# Patient Record
Sex: FEMALE | Race: WHITE
[De-identification: names, ages, dates, MRNs, and addresses within clinical notes are randomized per-mention and may not be internally consistent; named-entity substitution may affect disease eponyms.]

---

## 2018-10-03 ENCOUNTER — HOSPITAL ENCOUNTER (OUTPATIENT)
Dept: HOSPITAL 74 - FASU | Age: 73
Discharge: HOME | End: 2018-10-03
Attending: OPHTHALMOLOGY
Payer: COMMERCIAL

## 2018-10-03 VITALS — DIASTOLIC BLOOD PRESSURE: 68 MMHG | HEART RATE: 71 BPM | SYSTOLIC BLOOD PRESSURE: 135 MMHG | TEMPERATURE: 98 F

## 2018-10-03 VITALS — BODY MASS INDEX: 37.9 KG/M2

## 2018-10-03 DIAGNOSIS — H26.8: Primary | ICD-10-CM

## 2018-10-03 PROCEDURE — 08RJ3JZ REPLACEMENT OF RIGHT LENS WITH SYNTHETIC SUBSTITUTE, PERCUTANEOUS APPROACH: ICD-10-PCS | Performed by: OPHTHALMOLOGY

## 2018-10-03 RX ADMIN — CYCLOPENTOLATE HYDROCHLORIDE ONE DROP: 20 SOLUTION/ DROPS OPHTHALMIC at 07:35

## 2018-10-03 RX ADMIN — TROPICAMIDE ONE DROP: 10 SOLUTION/ DROPS OPHTHALMIC at 07:25

## 2018-10-03 RX ADMIN — CYCLOPENTOLATE HYDROCHLORIDE ONE DROP: 20 SOLUTION/ DROPS OPHTHALMIC at 07:30

## 2018-10-03 RX ADMIN — CIPROFLOXACIN HYDROCHLORIDE ONE DROP: 3 SOLUTION/ DROPS OPHTHALMIC at 07:25

## 2018-10-03 RX ADMIN — CYCLOPENTOLATE HYDROCHLORIDE ONE DROP: 20 SOLUTION/ DROPS OPHTHALMIC at 07:25

## 2018-10-03 RX ADMIN — PHENYLEPHRINE HYDROCHLORIDE ONE DROP: 0.25 SPRAY NASAL at 07:30

## 2018-10-03 RX ADMIN — CIPROFLOXACIN HYDROCHLORIDE ONE DROP: 3 SOLUTION/ DROPS OPHTHALMIC at 07:35

## 2018-10-03 RX ADMIN — TROPICAMIDE ONE DROP: 10 SOLUTION/ DROPS OPHTHALMIC at 07:35

## 2018-10-03 RX ADMIN — CIPROFLOXACIN HYDROCHLORIDE ONE DROP: 3 SOLUTION/ DROPS OPHTHALMIC at 07:30

## 2018-10-03 RX ADMIN — PHENYLEPHRINE HYDROCHLORIDE ONE DROP: 0.25 SPRAY NASAL at 07:25

## 2018-10-03 RX ADMIN — PHENYLEPHRINE HYDROCHLORIDE ONE DROP: 0.25 SPRAY NASAL at 07:35

## 2018-10-03 RX ADMIN — TROPICAMIDE ONE DROP: 10 SOLUTION/ DROPS OPHTHALMIC at 07:30

## 2018-10-03 NOTE — OP
DATE OF OPERATION:  10/03/2018

 

OPERATIVE PROCEDURE:  Lens Phacoemulsification with Posterior Chamber Intraocular

Lens Placement, Right Eye

 

PREOPERATIVE DIAGNOSIS: Visually Significant Cataract of Right Eye

 

POSTOPERATIVE DIAGNOSIS:  Visually Significant Cataract of Right Eye

 

SURGEON:  Jluis Dowling M.D.

 

ANESTHESIA:  MAC

 

PROCEDURE:  The patient was brought to the operating room and placed under monitored

anesthesia care by Anesthesia.  A drop of Tetracaine was then placed over the right

eye.  The patient was then prepped and draped in the usual sterile manner.  A

speculum was then placed over the right eye.  The eye was then well irrigated with

copious amounts of BSS (balanced salt solution). 

 

The operating microscope was then moved into position.  A paracentesis was performed

using a 15 degree blade. At this point 0.5 mL of 1% preservative free-lidocaine was

injected into the anterior chamber.  Amvisc plus was then injected into the anterior

chamber.  A clear corneal incision was then formed using a 2.2 mm keratome.  A

capsulorrhexis was then performed in a continuous circular fashion beginning with a

cystotome completed with an Utratas forceps. Hydrodissection was then performed using

BSS on a cannula.  The phaco probe was then introduced through the corneal wound and

the cataract was removed using the phaco chop technique.  Approximately 3 seconds of

absolute phaco time was used.  The remaining cortex was then removed using irrigation

and aspiration with an I/A probe. The capsule was then filled with regular Amvisc and

the capsule was noted to be intact.  A previously selected foldable posterior chamber

intraocular lens was then injected into the capsule through the corneal wound using a

lens injector.  It was then dialed into position using a Sinskey hook.  The Amvisc

was then removed using irrigation and aspiration.  Miostat was then injected through

the paracentesis to constrict the pupil.  The paracentesis and corneal wound were

then hydrated and noted to be water tight. A drop of Maxitrol was then placed over

the eye.  The speculum was removed and clear shield was taped over the eye. 

 

The patient tolerated the procedure well and there were no surgical complications. 

The patient was asked to follow up in my office the next day.

 

 

JLUIS DOWLING M.D. [id number]

DD: 10/03/2018 08:48

DT: 10/03/2018 09:14

Job #:  [id number]

## 2018-11-07 ENCOUNTER — HOSPITAL ENCOUNTER (OUTPATIENT)
Dept: HOSPITAL 74 - FASU | Age: 73
Discharge: HOME | End: 2018-11-07
Attending: OPHTHALMOLOGY
Payer: COMMERCIAL

## 2018-11-07 VITALS — DIASTOLIC BLOOD PRESSURE: 65 MMHG | SYSTOLIC BLOOD PRESSURE: 128 MMHG | HEART RATE: 70 BPM

## 2018-11-07 VITALS — TEMPERATURE: 97 F

## 2018-11-07 VITALS — BODY MASS INDEX: 37.1 KG/M2

## 2018-11-07 DIAGNOSIS — H21.542: ICD-10-CM

## 2018-11-07 DIAGNOSIS — H26.8: Primary | ICD-10-CM

## 2018-11-07 PROCEDURE — 08N1XZZ RELEASE LEFT EYE, EXTERNAL APPROACH: ICD-10-PCS | Performed by: OPHTHALMOLOGY

## 2018-11-07 PROCEDURE — 08RK3JZ REPLACEMENT OF LEFT LENS WITH SYNTHETIC SUBSTITUTE, PERCUTANEOUS APPROACH: ICD-10-PCS | Performed by: OPHTHALMOLOGY

## 2018-11-07 RX ADMIN — TROPICAMIDE ONE DROP: 10 SOLUTION/ DROPS OPHTHALMIC at 07:20

## 2018-11-07 RX ADMIN — CYCLOPENTOLATE HYDROCHLORIDE ONE DROP: 20 SOLUTION/ DROPS OPHTHALMIC at 07:20

## 2018-11-07 RX ADMIN — CIPROFLOXACIN HYDROCHLORIDE ONE DROP: 3 SOLUTION/ DROPS OPHTHALMIC at 07:10

## 2018-11-07 RX ADMIN — PHENYLEPHRINE HYDROCHLORIDE ONE DROP: 0.25 SPRAY NASAL at 07:10

## 2018-11-07 RX ADMIN — CIPROFLOXACIN HYDROCHLORIDE ONE DROP: 3 SOLUTION/ DROPS OPHTHALMIC at 07:20

## 2018-11-07 RX ADMIN — PHENYLEPHRINE HYDROCHLORIDE ONE DROP: 0.25 SPRAY NASAL at 07:20

## 2018-11-07 RX ADMIN — CYCLOPENTOLATE HYDROCHLORIDE ONE DROP: 20 SOLUTION/ DROPS OPHTHALMIC at 07:15

## 2018-11-07 RX ADMIN — PHENYLEPHRINE HYDROCHLORIDE ONE DROP: 0.25 SPRAY NASAL at 07:15

## 2018-11-07 RX ADMIN — TROPICAMIDE ONE DROP: 10 SOLUTION/ DROPS OPHTHALMIC at 07:10

## 2018-11-07 RX ADMIN — TROPICAMIDE ONE DROP: 10 SOLUTION/ DROPS OPHTHALMIC at 07:15

## 2018-11-07 RX ADMIN — CIPROFLOXACIN HYDROCHLORIDE ONE DROP: 3 SOLUTION/ DROPS OPHTHALMIC at 07:15

## 2018-11-07 RX ADMIN — CYCLOPENTOLATE HYDROCHLORIDE ONE DROP: 20 SOLUTION/ DROPS OPHTHALMIC at 07:10

## 2018-11-07 NOTE — OP
DATE OF OPERATION:  11/07/2018

 

OPERATIVE PROCEDURE:  Lysis of Posterior Iris Lens Synechia and Lens

Phacoemulsification with Posterior Chamber Intraocular Lens Placement, Left Eye

 

PREOPERATIVE DIAGNOSIS:  Visually Significant Cataract and Posterior Synechia of Left

Eye

 

POSTOPERATIVE DIAGNOSIS:  Visually Significant Cataract and Posterior Synechia of

Left Eye

 

SURGEON:  Jluis Dowling M.D.

 

ANESTHESIA:  MAC

 

PROCEDURE:  The patient was brought to the operating room and placed under monitored

anesthesia care by Anesthesia.  A drop of Tetracaine was then placed over the left

eye.  The patient was then prepped and draped in the usual sterile manner.  A

speculum was then placed over the left eye.  The eye was then well irrigated with

copious amounts of BSS (balanced salt solution).  The operating microscope was then

moved into position. 

 

A paracentesis was performed using a 15 degree blade. At this point, 0.5 mL of 1%

preservative-free lidocaine was injected into the anterior chamber.  Amvisc plus was

then injected into the anterior chamber.  A clear corneal incision was then formed

using a 2.2 mm keratome.  A cyclodialysis spatula was then used to break the

posterior synechia.  Two Adbongoglen iris hooks were then used to stretch the iris.  More

Amvisc plus was then injected into the anterior chamber.  A capsulorrhexis was then

performed in a continuous circular fashion beginning with a cystotome and completed

with an Utrata's forceps.  Hydrodissection was then performed using BSS on a cannula.

The phaco probe was then introduced through the corneal wound and the cataract was

removed using the phaco chop technique.  Approximately 3 seconds of absolute phaco

time was used.  The remaining cortex was then removed using irrigation and aspiration

with an I/A probe.  The capsule was then filled with regular Amvisc and the capsule

was noted to be intact. A previously selected foldable posterior chamber intraocular

lens was then injected into the capsule through the corneal wound using a lens

injector.  It was then dialed into position using a Sinskey hook.  The Amvisc was

then removed using irrigation and aspiration.  Miostat was then injected through the

paracentesis to constrict the pupil.  The paracentesis and corneal wound were then

hydrated and noted to be water tight.  A drop of Maxitrol was then placed over the

eye.  The speculum was removed and clear shield was taped over the eye. 

 

The patient tolerated the procedure well and there were no surgical complications.

The patient was asked to follow up in my office the next day.

 

 

JLUIS DOWLING M.D.

 

[id number]

DD: 11/07/2018 09:23

DT: 11/07/2018 10:26

Job #:  [id number]

## 2019-07-06 ENCOUNTER — HOSPITAL ENCOUNTER (EMERGENCY)
Dept: HOSPITAL 74 - FER | Age: 74
Discharge: HOME | End: 2019-07-06
Payer: COMMERCIAL

## 2019-07-06 VITALS — SYSTOLIC BLOOD PRESSURE: 110 MMHG | HEART RATE: 85 BPM | DIASTOLIC BLOOD PRESSURE: 60 MMHG | TEMPERATURE: 98.5 F

## 2019-07-06 VITALS — BODY MASS INDEX: 37.1 KG/M2

## 2019-07-06 DIAGNOSIS — N20.0: Primary | ICD-10-CM

## 2019-07-06 LAB
ALBUMIN SERPL-MCNC: 3.8 G/DL (ref 3.4–5)
ALP SERPL-CCNC: 88 U/L (ref 45–117)
ALT SERPL-CCNC: 13 U/L (ref 13–61)
ANION GAP SERPL CALC-SCNC: 6 MMOL/L (ref 8–16)
APTT BLD: 26.8 SECONDS (ref 25.2–36.5)
AST SERPL-CCNC: 22 U/L (ref 15–37)
BASOPHILS # BLD: 0.4 % (ref 0–2)
BILIRUB SERPL-MCNC: 1.5 MG/DL (ref 0.2–1)
BUN SERPL-MCNC: 18 MG/DL (ref 7–18)
CALCIUM SERPL-MCNC: 8.7 MG/DL (ref 8.5–10)
CAOX CRY URNS QL MICRO: (no result) /HPF
CHLORIDE SERPL-SCNC: 103 MMOL/L (ref 98–107)
CO2 SERPL-SCNC: 28 MMOL/L (ref 21–32)
CREAT SERPL-MCNC: 0.7 MG/DL (ref 0.55–1.3)
DEPRECATED RDW RBC AUTO: 12.9 % (ref 11.6–15.6)
EOSINOPHIL # BLD: 1.2 % (ref 0–4.5)
EPITH CASTS URNS QL MICRO: (no result) /HPF
GLUCOSE SERPL-MCNC: 122 MG/DL (ref 74–106)
HCT VFR BLD CALC: 45.1 % (ref 32.4–45.2)
HGB BLD-MCNC: 15.3 GM/DL (ref 10.7–15.3)
INR BLD: 1.22 (ref 0.82–1.09)
LYMPHOCYTES # BLD: 10.6 % (ref 8–40)
MCH RBC QN AUTO: 30.2 PG (ref 25.7–33.7)
MCHC RBC AUTO-ENTMCNC: 33.8 G/DL (ref 32–36)
MCV RBC: 89.3 FL (ref 80–96)
MONOCYTES # BLD AUTO: 5.5 % (ref 3.8–10.2)
NEUTROPHILS # BLD: 82.3 % (ref 42.8–82.8)
PLATELET # BLD AUTO: 224 K/MM3 (ref 134–434)
PMV BLD: 7.4 FL (ref 7.5–11.1)
POTASSIUM SERPLBLD-SCNC: 3.7 MMOL/L (ref 3.5–5.1)
PROT SERPL-MCNC: 6.8 G/DL (ref 6.4–8.2)
PT PNL PPP: 13.6 SEC (ref 10.2–13)
RBC # BLD AUTO: 5.05 M/MM3 (ref 3.6–5.2)
SODIUM SERPL-SCNC: 137 MMOL/L (ref 136–145)
WBC # BLD AUTO: 10.1 K/MM3 (ref 4–10.8)

## 2019-07-06 PROCEDURE — 3E0337Z INTRODUCTION OF ELECTROLYTIC AND WATER BALANCE SUBSTANCE INTO PERIPHERAL VEIN, PERCUTANEOUS APPROACH: ICD-10-PCS

## 2019-07-06 PROCEDURE — 3E0333Z INTRODUCTION OF ANTI-INFLAMMATORY INTO PERIPHERAL VEIN, PERCUTANEOUS APPROACH: ICD-10-PCS

## 2019-07-06 PROCEDURE — 3E03329 INTRODUCTION OF OTHER ANTI-INFECTIVE INTO PERIPHERAL VEIN, PERCUTANEOUS APPROACH: ICD-10-PCS

## 2019-07-06 PROCEDURE — 3E033GC INTRODUCTION OF OTHER THERAPEUTIC SUBSTANCE INTO PERIPHERAL VEIN, PERCUTANEOUS APPROACH: ICD-10-PCS

## 2019-07-06 PROCEDURE — 3E033NZ INTRODUCTION OF ANALGESICS, HYPNOTICS, SEDATIVES INTO PERIPHERAL VEIN, PERCUTANEOUS APPROACH: ICD-10-PCS

## 2019-07-06 NOTE — PDOC
Attending Attestation





- Resident


Resident Name: Jam Hsu





- ED Attending Attestation


I have performed the following: I have examined & evaluated the patient, The 

case was reviewed & discussed with the resident, I agree w/resident's findings 

& plan, Exceptions are as noted





- HPI


HPI: 





07/06/19 13:33


72yo female with hx of diverticulitis with n/v/d and LLQ abd pain. Pt states 

pain started thursday and n/v/d started after the pain. 3 episodes of vomiting 

last night and this am - nonbloody and nonbilious. States black stool, no brb. 

Pt states she saw [facility] yesterday and was started on augmenting - n/v 

worsened and now with dry heaving. No fevers. Still with pain. Pt states she 

hasn't been able to eat or drink since thursday. Pt appears dehydrated upon 

exam. Pt ambulated into the ed. Denies urinary complaints. 





- Physicial Exam


PE: 





07/06/19 13:35


Gen: aaox3, dehydrated, dry mm


heent: dry cracked  lips and tongue


heart: +s1s2 reg


lungs: cta b/l


abd: soft, llq ttp, no rebound or guarding, obese abd


ext: trace swelling in LE, well healed mohs surgery sites.





- Medical Decision Making





07/06/19 13:36


a/p: 72yo female with LLQ pain, n/v/d


-black stool- will send for heme


-on augmentin for poss diverticulitis since yesterday still with n/v/d


-still with pain


-will send labs, rectal exam, ct abd/pelvis


-pt with shellfish allergy


-will obtain noncontrast ct


-pt appears dehydrated, will medicate


-will monitor and reassess


-pt ambulated into the ED


07/06/19 13:37


labs reviewed, no elevated wbc


07/06/19 13:37


stool for heme negative


07/06/19 15:42


pt with kidney stone and uti


will start iv rocephin


will start flomax





07/06/19 17:13


pt feeling better


wants to go home


will dc with meds and abx


will give urology follow up


resident discussed all reasons to return to the ED

## 2019-07-06 NOTE — PDOC
History of Present Illness





- General


Chief Complaint: Pain


Stated Complaint: ABD PAIN


Time Seen by Provider: 07/06/19 12:02


History Source: Patient, Family





- History of Present Illness


Initial Comments: 





07/06/19 12:07


Ms. Aguila is a 72 y/o woman with hx of diverticulitis p/w two days of ongoing 

LLQ pain radiating to her back. She was seen by her gastroenteroligist (Dr. [facility]ero[facility]: [phone]) yesterday (7/5/2019) whom initiated treatment for 

diverticulitis with amoxicillin-clavulanate 875-125mg (abx day 2). They 

instructed her to return for CT evaluation in the pain worsened. Last night she 

had difficulty sleeping due to the pain in her LLQ, which prompted her to seek 

evaluation today. She reports that last night the pain was a 10/10, mildly 

alleviated by ice packs, radiating to her back. She reports that the pain is 

intermittent - she is not in pain at this time. She reports decrease in 

appetite since the pain began, and reports several episodes (approx 7) of non-

bloody non-bilious vomiting over the past two days. She reports having "a few" 

dark, bloody bowel movements yesterday, but has been passing stool and gas 

without difficulty. She denies any fever, chills, (change in appetite), fatigue

, night sweats. She reports that her last colonoscopy as 3-4 years ago and 

showed only diverticulosis. 





PCP: Dr. Alcazar


Timing/Duration: other (2 days)


Severity: moderate, severe


Modifying Factors: improves with: cold therapy (alleviated mildly)


Associated Symptoms: reports: loss of appetite, nausea/vomiting (4-5 times July 4/5th, 3x yesterday. Non-bloody non billious).  denies: chest pain, cough, 

diaphoresis, fever/chills, headaches, rash, syncope, weakness





Past History





- Past Medical History


Allergies/Adverse Reactions: 


 Allergies











Allergy/AdvReac Type Severity Reaction Status Date / Time


 


codeine Allergy Severe Vomiting Verified 07/06/19 12:11


 


gemifloxacin [From Factive] Allergy Severe Itching Verified 07/06/19 12:11


 


latex Allergy Severe Swelling Verified 07/06/19 12:11


 


levofloxacin [From Levaquin] Allergy Severe Itching Verified 07/06/19 12:11


 


shellfish derived Allergy Severe Swelling Verified 07/06/19 12:11


 


tramadol [From Ultram] Allergy Severe Itching Verified 07/06/19 12:11


 


methylprednisolone AdvReac Severe Swelling Verified 07/06/19 12:11











Home Medications: 


Ambulatory Orders





Cholecalciferol (Vitamin D3) [Vitamin D3] 1,000 unit PO DAILY 09/13/18 


Cyanocobalamin [Vitamin B12 -] 1,000 mcg PO DAILY 09/13/18 


Levothyroxine [Synthroid -] 100 mcg PO DAILY 09/13/18 


Magnesium Oxide [Mag-Ox -] 400 mg PO DAILY 09/13/18 


Meloxicam 15 mg PO DAILY 09/13/18 


Multivitamins [Multivit ([facility] Formulary)] 1 tab PO DAILY 09/13/18 


Telmisartan 20 mg PO DAILY 09/13/18 


Polyethylene Glycol 3350 [Miralax 119 gm Btl -] 17 gm PO DAILY 10/29/18 


Saccharomyces Boulardii [Florastor] 250 mg PO BID 10/29/18 


Amoxicillin/Potassium Clav [Augmentin 875-125 Tablet] 1 each PO BID 07/06/19 


Calcium Carb/Vitamin D3/Vit K1 [Viactiv 650 mg-12.5 Mcg Chew] 1 each PO DAILY 07 /06/19 


Cephalexin [Keflex] 500 mg PO BID 7 Days #14 capsule 07/06/19 


Famotidine [Pepcid] 40 mg PO HS PRN 07/06/19 


Ibuprofen [Motrin -] 600 mg PO TID #15 tablet 07/06/19 


Ondansetron [Zofran Odt -] 4 mg SL TID #10 od.tablet 07/06/19 


Tamsulosin HCl [Flomax] 0.4 mg PO DAILY 14 Days #14 capsule 07/06/19 








Anemia: No


Asthma: No


Cancer: Yes (SKIN CANCER)


Cardiac Disorders: No


CVA: No


COPD: No


CHF: No


Dementia: No


Diabetes: No


GI Disorders: Yes (GERD,diverticulitis)


 Disorders: No


HTN: Yes


Hypercholesterolemia: No


Liver Disease: No


Seizures: No


Thyroid Disease: Yes


Other medical history: KNEE PAINS





- Surgical History


Abdominal Surgery: No


Appendectomy: No


Cardiac Surgery: No


Cholecystectomy: No


Lung Surgery: No


Neurologic Surgery: No


Orthopedic Surgery: Yes (BUNION, ARTHROSCOPY)





- Suicide/Smoking/Psychosocial Hx


Smoking History: Never smoked


Have you smoked in the past 12 months: No


Information on smoking cessation initiated: No


Hx Alcohol Use: No


Drug/Substance Use Hx: No


Substance Use Type: None


Hx Substance Use Treatment: No





**Review of Systems





- Review of Systems


Able to Perform ROS?: Yes


Constitutional: Yes: See HPI, Loss of Appetite.  No: Chills, Diaphoresis, Fever

, Night Sweats, Weakness


HEENTM: Yes: See HPI


Respiratory: Yes: See HPI.  No: Cough, Shortness of Breath, Wheezing, 

Productive cough


Cardiac (ROS): Yes: See HPI.  No: Chest Pain, Edema, Lightheadedness, Syncope


ABD/GI: Yes: See HPI, Nausea, Vomiting, Abdominal cramping, Tarry Stools.  No: 

Abdominal Distended, Abd. Pain w/ defecation, Constipated


: Yes: See HPI, Flank Pain.  No: Burning, Dysuria, Frequency, Incontinence, 

Pain, Urgency





*Physical Exam





- Vital Signs


 Last Vital Signs











Temp Pulse Resp BP Pulse Ox


 


 98.5 F   85   18   110/60   96 


 


 07/06/19 11:58  07/06/19 11:58  07/06/19 11:58  07/06/19 11:58  07/06/19 11:58














- Physical Exam


General Appearance: Yes: Nourished, Appropriately Dressed.  No: Apparent 

Distress


Neck: positive: Trachea midline


Respiratory/Chest: positive: Lungs Clear, Normal Breath Sounds.  negative: 

Respiratory Distress, Accessory Muscle Use, Decreased Breath Sounds, Crackles, 

Wheezing


Cardiovascular: positive: Regular Rhythm, Regular Rate, S1, S2.  negative: 

Murmur


Gastrointestinal/Abdominal: positive: Normal Bowel Sounds, Tender (LLQ), Soft, 

Tenderness.  negative: Pulsatile Mass, Increased Bowel Sounds, Decreased BS, 

Rebound





ED Treatment Course





- LABORATORY


CBC & Chemistry Diagram: 


 07/06/19 12:55





 07/06/19 12:55





Medical Decision Making





- Medical Decision Making





07/06/19 12:32


72 y/o F with hx diverticulitis on abx for diverticulitis flare (day 2 of 10 of 

amox/clav) p/w worsening abdominal pain, LLQ tenderness, N/V, and loss of 

appetite, most consistent with acute diverticulitis. Plan for basic labs, IV 

zofran, pepcid, acetaminophen for nausea/vomiting/pain, liter bolus for 

rehydration. Plan for CT abdomen/pelvis to evaluate for possible 

microperforation given worsening abdominal pain. 








Plan: 


CBC w/diff


CMP


Famotidine


Zofran


1 L IV bolus NS


CT Abdomen Pelvis w/IV contrast


Coags





Dispo: 


Pending CT results





07/06/19 13:09


Patient unsure of seafood allergy, will order CT without contrast. 





*DC/Admit/Observation/Transfer


Diagnosis at time of Disposition: 


 Kidney stone on left side








- Discharge Dispostion


Disposition: HOME


Condition at time of disposition: Stable


Decision to Admit order: No





- Prescriptions


Prescriptions: 


Cephalexin [Keflex] 500 mg PO BID 7 Days #14 capsule


Ibuprofen [Motrin -] 600 mg PO TID #15 tablet


Ondansetron [Zofran Odt -] 4 mg SL TID #10 od.tablet


Tamsulosin HCl [Flomax] 0.4 mg PO DAILY 14 Days #14 capsule





- Referrals


Referrals: 


Sheila Alcazar MD [Primary Care Provider] - 


Ravindra Crane MD [Staff Physician] - 





- Patient Instructions


Additional Instructions: 


You were evaluated in the emergency department by Dr. Hsu and Dr. Low. We evaluated your symptoms, took a medical history, evaluated blood 

work, and ordered a CT scan. We are diagnosing you with a kidney stone in your 

left kidney. Please take all prescribed medications as directed. Please return 

to the ED if you develop worsening pain, if you develop a high fever, or if you 

develop any other symptoms that are concerning to you. Please follow up with a 

urologist. 





- Post Discharge Activity

## 2019-08-10 ENCOUNTER — HOSPITAL ENCOUNTER (EMERGENCY)
Dept: HOSPITAL 74 - FER | Age: 74
LOS: 1 days | Discharge: HOME | End: 2019-08-11
Payer: COMMERCIAL

## 2019-08-10 VITALS — SYSTOLIC BLOOD PRESSURE: 148 MMHG | HEART RATE: 65 BPM | DIASTOLIC BLOOD PRESSURE: 57 MMHG | TEMPERATURE: 98.3 F

## 2019-08-10 VITALS — BODY MASS INDEX: 37.3 KG/M2

## 2019-08-10 DIAGNOSIS — M17.0: ICD-10-CM

## 2019-08-10 DIAGNOSIS — S09.90XA: Primary | ICD-10-CM
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
## 2019-08-10 DIAGNOSIS — G89.29: ICD-10-CM

## 2019-08-10 NOTE — PDOC
Documentation entered by Ricardo Garcia SCRIBE, acting as scribe for Mariajose Cortez MD.








Mariajose Cortez MD:  This documentation has been prepared by the Radha hinojosa Xhesika, SCRIBE, under my direction and personally reviewed by me in its 

entirety.  I confirm that the documentation accurately reflects all work, 

treatment, procedures, and medical decision making performed by me.  





History of Present Illness





- General


Chief Complaint: Injury


Stated Complaint: FALL


Time Seen by Provider: 08/10/19 22:08


History Source: Patient


Exam Limitations: No Limitations





- History of Present Illness


Initial Comments: 





08/10/19 22:18


The patient is a 73 year old female with a significant PMH of diverticulitis 

and chronic knee arthritis  who presents to the emergency department with b/l 

knee pain s/p fall. The patient states she was going to grab something from the 

refrigerator, tripped, fell backwards landing on her buttocks,she tried 

catching her fall and hit her head on the .  Patient states she tried 

to get up, however, she could not put pressure on her knees because she has 

chronic arthritis (got knee injections a couple weeks ago). Patient notes she 

lives at home with her daughter and ambulates with a cane at baseline. Pt notes 

she was seen here last week for diverticulitis and CT showed kidney stones. 





The patient denies chest pain, shortness of breath, headache and dizziness. 

Denies fever, chills, cough, nausea, vomiting, diarrhea and constipation. 

Denies dysuria, frequency, urgency and hematuria.





Allergies: NKDA








Past History





- Past Medical History


Allergies/Adverse Reactions: 


 Allergies











Allergy/AdvReac Type Severity Reaction Status Date / Time


 


codeine Allergy Severe Vomiting Verified 07/06/19 12:11


 


gemifloxacin [From Factive] Allergy Severe Itching Verified 07/06/19 12:11


 


latex Allergy Severe Swelling Verified 07/06/19 12:11


 


levofloxacin [From Levaquin] Allergy Severe Itching Verified 07/06/19 12:11


 


shellfish derived Allergy Severe Swelling Verified 07/06/19 12:11


 


tramadol [From Ultram] Allergy Severe Itching Verified 07/06/19 12:11


 


methylprednisolone AdvReac Severe Swelling Verified 07/06/19 12:11











Home Medications: 


Ambulatory Orders





Cholecalciferol (Vitamin D3) [Vitamin D3] 1,000 unit PO DAILY 09/13/18 


Cyanocobalamin [Vitamin B12 -] 1,000 mcg PO DAILY 09/13/18 


Levothyroxine [Synthroid -] 100 mcg PO DAILY 09/13/18 


Magnesium Oxide [Mag-Ox -] 400 mg PO DAILY 09/13/18 


Meloxicam 15 mg PO DAILY 09/13/18 


Multivitamins [Multivit ([facility] Formulary)] 1 tab PO DAILY 09/13/18 


Telmisartan 20 mg PO DAILY 09/13/18 


Polyethylene Glycol 3350 [Miralax 119 gm Btl -] 17 gm PO DAILY 10/29/18 


Saccharomyces Boulardii [Florastor] 250 mg PO BID 10/29/18 


Amoxicillin/Potassium Clav [Augmentin 875-125 Tablet] 1 each PO BID 07/06/19 


Calcium Carb/Vitamin D3/Vit K1 [Viactiv 650 mg-12.5 Mcg Chew] 1 each PO DAILY 07 /06/19 


Cephalexin [Keflex] 500 mg PO BID 7 Days #14 capsule 07/06/19 


Famotidine [Pepcid] 40 mg PO HS PRN 07/06/19 


Ibuprofen [Motrin -] 600 mg PO TID #15 tablet 07/06/19 


Ondansetron [Zofran Odt -] 4 mg SL TID #10 od.tablet 07/06/19 


Tamsulosin HCl [Flomax] 0.4 mg PO DAILY 14 Days #14 capsule 07/06/19 








Anemia: No


Asthma: No


Cancer: Yes (SKIN CANCER)


Cardiac Disorders: No


CVA: No


COPD: No


CHF: No


Dementia: No


Diabetes: No


GI Disorders: Yes (GERD,diverticulitis)


 Disorders: No


HTN: Yes


Hypercholesterolemia: No


Liver Disease: No


Seizures: No


Thyroid Disease: Yes





- Surgical History


Abdominal Surgery: No


Appendectomy: No


Cardiac Surgery: No


Cholecystectomy: No


Lung Surgery: No


Neurologic Surgery: No


Orthopedic Surgery: Yes (BUNION, ARTHROSCOPY)





- Suicide/Smoking/Psychosocial Hx


Smoking History: Never smoked


Have you smoked in the past 12 months: No


Hx Alcohol Use: No


Drug/Substance Use Hx: No


Substance Use Type: None


Hx Substance Use Treatment: No





**Review of Systems





- Review of Systems


Constitutional: No: Chills, Diaphoresis


HEENTM: No: Eye Pain, Blurred Vision, Difficulty Swallowing


Respiratory: No: Orthopnea, Shortness of Breath


Cardiac (ROS): No: Chest Pain, Edema


: No: Burning, Dysuria


Musculoskeletal: Yes: Joint Pain


Integumentary: No: Bruising, Change in Color


All Other Systems: Reviewed and Negative





*Physical Exam





- Physical Exam


Comments: 





08/10/19 22:11


awake alert NAD head atraumatic. no cervical spine tenderness. lungs clear 

bilat heart rrr no mrg chest wall nt. abd soft nt nd. ext wwp knee bilat from. 

no abrasion. small eccymosis punctate 1 cm. bilat anterior knee ( old). ankle 

nt from)





ED Treatment Course





- RADIOLOGY


Radiology Studies Ordered: 














 Category Date Time Status


 


 HEAD CT WITHOUT CONTRAST [CT] Stat CT Scan  08/10/19 22:09 Ordered


 


 KNEE 2 POS-LEFT [RAD] Stat Radiology  08/10/19 22:09 Ordered


 


 KNEE 2 POS-RIGHT [RAD] Stat Radiology  08/10/19 22:09 Ordered














Medical Decision Making





- Medical Decision Making





08/10/19 22:14


72 yo s/p trip and fall backward hit head on , denies loc. no n/v no 

weakness. was unable to stand because had recent knee injections and would have 

to put pressure on knee to stand up. 


plan ct head. xray bilat knees. tylenol.


08/10/19 23:22


pt with negative fracture on bilat knee xray. severe djd.  head ct no acute 

changes.  will dc home [facility] daughter. fu orthopedics as needed.


08/10/19 23:56


d/w pt and her daughter. has fu scheduled for this thursday 5 days fro today 

[facility] dr Esperanza Quiles. 





*DC/Admit/Observation/Transfer


Diagnosis at time of Disposition: 


 Head trauma, Arthritis of knee








- Discharge Dispostion


Disposition: HOME


Condition at time of disposition: Improved





- Referrals


Referrals: 


Sheila Alcazar MD [Staff Physician] - 





- Patient Instructions


Printed Discharge Instructions:  DI for Closed Head Injury, DI for Knee Pain


Additional Instructions: 


you can take tylenol 500 mg every 6 hrs as needed for pain. return for any 

vomiting headache. confusion or any concerns your xray of your knees show 

severe arthritis, no acute broken bones. your head ct is negative for any acute 

injuries. follow up wt your primary doctor and your orthopedist.





- Post Discharge Activity